# Patient Record
Sex: MALE | Race: WHITE | NOT HISPANIC OR LATINO | Employment: OTHER | URBAN - METROPOLITAN AREA
[De-identification: names, ages, dates, MRNs, and addresses within clinical notes are randomized per-mention and may not be internally consistent; named-entity substitution may affect disease eponyms.]

---

## 2023-02-14 RX ORDER — IBUPROFEN 400 MG/1
200 TABLET ORAL
COMMUNITY
End: 2023-02-16

## 2023-02-16 ENCOUNTER — OFFICE VISIT (OUTPATIENT)
Dept: SURGERY | Facility: CLINIC | Age: 43
End: 2023-02-16
Payer: COMMERCIAL

## 2023-02-16 VITALS
WEIGHT: 210.6 LBS | DIASTOLIC BLOOD PRESSURE: 80 MMHG | HEART RATE: 89 BPM | HEIGHT: 71 IN | TEMPERATURE: 97.2 F | SYSTOLIC BLOOD PRESSURE: 110 MMHG | BODY MASS INDEX: 29.48 KG/M2 | OXYGEN SATURATION: 98 %

## 2023-02-16 DIAGNOSIS — Z83.71 FAMILY HISTORY OF COLONIC POLYPS: ICD-10-CM

## 2023-02-16 DIAGNOSIS — K62.89 HYPERTROPHIED ANAL PAPILLA: Primary | ICD-10-CM

## 2023-02-16 PROCEDURE — 99204 OFFICE O/P NEW MOD 45 MIN: CPT | Performed by: PHYSICIAN ASSISTANT

## 2023-02-16 PROCEDURE — 46600 DIAGNOSTIC ANOSCOPY SPX: CPT | Performed by: PHYSICIAN ASSISTANT

## 2023-02-16 RX ORDER — AZELASTINE HYDROCHLORIDE 137 UG/1
SPRAY, METERED NASAL
COMMUNITY
Start: 2022-11-21

## 2023-02-16 RX ORDER — PREDNISONE 20 MG/1
TABLET ORAL
COMMUNITY
Start: 2022-12-13 | End: 2023-02-16

## 2023-02-16 RX ORDER — SODIUM CHLORIDE, SODIUM LACTATE, POTASSIUM CHLORIDE, CALCIUM CHLORIDE 600; 310; 30; 20 MG/100ML; MG/100ML; MG/100ML; MG/100ML
30 INJECTION, SOLUTION INTRAVENOUS CONTINUOUS
Status: CANCELLED | OUTPATIENT
Start: 2023-04-27

## 2023-02-16 RX ORDER — CLOTRIMAZOLE AND BETAMETHASONE DIPROPIONATE 10; .64 MG/G; MG/G
CREAM TOPICAL
COMMUNITY
Start: 2022-12-13

## 2023-02-16 RX ORDER — TRIAMCINOLONE ACETONIDE 1 MG/G
CREAM TOPICAL
COMMUNITY
Start: 2022-11-20 | End: 2023-02-16

## 2023-02-16 RX ORDER — AZITHROMYCIN 250 MG/1
TABLET, FILM COATED ORAL
COMMUNITY
Start: 2022-11-21 | End: 2023-02-16

## 2023-02-16 NOTE — PROGRESS NOTES
Carlos Alberto Guzman is a 42 y.o. male who is seen as a self-referred consult for hemorrhoid vs. Anal tag.      HPI:  First felt a tag or hemorrhoid/extra tissue about 6 months ago. Not sure if skin tag or hemorrhoid. Never been painful. Occasional bleeding if BMs aren't normal, which is rare. The day he called to schedule the appointment, he had a strange sensation of constantly feeling the need to defecate. A day later that feeling went away. He doesn't recall any potential exacerbating factors other than stress. Denies associated pain or itching. Has BM 1-2 times per day. [place] 3-4 stool consistency. Doesn't strain much with BMs. No creams, ointments, or other topical treatments. Father has history of colon polyp. No family history of colorectal cancer. No prior colonoscopy.     Past Medical History:   Diagnosis Date   • Cornea abrasion, left, initial encounter      History reviewed. No pertinent surgical history.    Social History:   reports that he has never smoked. He has never been exposed to tobacco smoke. He has never used smokeless tobacco. He reports that he does not use drugs.    Marriage status:     Family History   Problem Relation Age of Onset   • Cancer Mother         CARCOMA   • Cancer Maternal Aunt         SKIN CANCER.   • Cancer Maternal Grandmother         SKIN CANCER.   • Cancer Maternal Grandfather         SKIN CANCER.   • Heart disease Paternal Grandfather    • Cancer Paternal Grandfather         SKIN AND BONE CANCER       Current Outpatient Medications:   •  Azelastine HCl 137 MCG/SPRAY solution, , Disp: , Rfl:   •  clotrimazole-betamethasone (LOTRISONE) 1-0.05 % cream, , Disp: , Rfl:   •  fluocinonide (LIDEX) 0.05 % cream, Apply  topically to the appropriate area as directed., Disp: , Rfl:     Allergy  Patient has no known allergies.    Vitals:    02/16/23 1439   BP: 110/80   Pulse: 89   Temp: 97.2 °F (36.2 °C)   SpO2: 98%   -  Body mass index is 29.37 kg/m².    Physical Exam  No acute  distress  Chaperone present  Perianal exam: pedunculated hypertrophied anal papilla in left anterior position. KEYA: good tone, no masses. Anoscopy performed: Grade 0-1 x 3 internal hemorrhoids    Assessment:  1. Hypertrophied anal papilla    2. Family history of colonic polyps        Plan:  • Schedule colonoscopy and excision of anal papilla  • Call with any symptom changes    Time Spent: I spent 53 minutes caring for Carlos Alberto on this date of service. This time includes time spent by me in the following activities: preparing for the visit, performing a medically appropriate examination and/or evaluation, counseling and educating the patient/family/caregiver, ordering medications, tests, or procedures, referring and communicating with other health care professionals, documenting information in the medical record and care coordination.     Clara Mcnally PA-C  Physician Assistant  Colorectal Surgery

## 2023-02-17 ENCOUNTER — PATIENT ROUNDING (BHMG ONLY) (OUTPATIENT)
Dept: SURGERY | Facility: CLINIC | Age: 43
End: 2023-02-17
Payer: COMMERCIAL

## 2023-02-17 NOTE — PROGRESS NOTES
February 17, 2023      I am calling to officially welcome you to our practice and ask about your recent visit. Is this a good time to talk? No. Left voicemail to call back.

## 2023-02-27 ENCOUNTER — TELEPHONE (OUTPATIENT)
Dept: SURGERY | Facility: CLINIC | Age: 43
End: 2023-02-27
Payer: COMMERCIAL

## 2023-02-27 NOTE — TELEPHONE ENCOUNTER
Pt called has questions about upcoming cy and anal papilla excision.Pt would like to speak with you directly. Pt also wanted you to know that he spoke with his father after his appointment and he did not have colon polyps.

## 2023-02-28 NOTE — PROGRESS NOTES
Spoke with patient via telephone.  He had clarification questions regarding anal papilla/polyp/condyloma.  I answered all questions.  We discussed that if he has a negative family history for colon cancer or colon polyps, we could potentially cancel the colonoscopy and have him follow-up with Dr. Zepeda for potential in office excision of anal papilla.  If he does have a positive family history, we could proceed as planned.  He will clarify with family members and call back.

## 2023-04-25 ENCOUNTER — TELEPHONE (OUTPATIENT)
Dept: SURGERY | Facility: CLINIC | Age: 43
End: 2023-04-25
Payer: COMMERCIAL

## 2023-04-25 NOTE — TELEPHONE ENCOUNTER
CALLED TO REMIND OF CY, INFORMED OF ARRIVAL TIME AND LOCATION PT VOICED UNDERSTANDING. ALSO WENT OVER CLEAR LIQUID DIET AS WELL AS TIMES FOR PREP

## 2023-04-27 ENCOUNTER — ANESTHESIA (OUTPATIENT)
Dept: GASTROENTEROLOGY | Facility: HOSPITAL | Age: 43
End: 2023-04-27
Payer: COMMERCIAL

## 2023-04-27 ENCOUNTER — ANESTHESIA EVENT (OUTPATIENT)
Dept: GASTROENTEROLOGY | Facility: HOSPITAL | Age: 43
End: 2023-04-27
Payer: COMMERCIAL

## 2023-04-27 ENCOUNTER — HOSPITAL ENCOUNTER (OUTPATIENT)
Facility: HOSPITAL | Age: 43
Setting detail: HOSPITAL OUTPATIENT SURGERY
Discharge: HOME | End: 2023-04-27
Attending: COLON & RECTAL SURGERY | Admitting: COLON & RECTAL SURGERY
Payer: COMMERCIAL

## 2023-04-27 VITALS
SYSTOLIC BLOOD PRESSURE: 108 MMHG | HEIGHT: 71 IN | DIASTOLIC BLOOD PRESSURE: 66 MMHG | BODY MASS INDEX: 29.26 KG/M2 | OXYGEN SATURATION: 98 % | HEART RATE: 89 BPM | RESPIRATION RATE: 16 BRPM | WEIGHT: 209 LBS

## 2023-04-27 DIAGNOSIS — K64.4 ANAL SKIN TAG: Primary | ICD-10-CM

## 2023-04-27 DIAGNOSIS — Z83.71 FAMILY HISTORY OF COLONIC POLYPS: ICD-10-CM

## 2023-04-27 DIAGNOSIS — K62.89 HYPERTROPHIED ANAL PAPILLA: ICD-10-CM

## 2023-04-27 PROCEDURE — 25010000002 PROPOFOL 10 MG/ML EMULSION: Performed by: ANESTHESIOLOGY

## 2023-04-27 PROCEDURE — 25010000002 KETOROLAC TROMETHAMINE PER 15 MG: Performed by: ANESTHESIOLOGY

## 2023-04-27 PROCEDURE — 88305 TISSUE EXAM BY PATHOLOGIST: CPT | Performed by: COLON & RECTAL SURGERY

## 2023-04-27 RX ORDER — KETOROLAC TROMETHAMINE 30 MG/ML
INJECTION, SOLUTION INTRAMUSCULAR; INTRAVENOUS AS NEEDED
Status: DISCONTINUED | OUTPATIENT
Start: 2023-04-27 | End: 2023-04-27 | Stop reason: SURG

## 2023-04-27 RX ORDER — LIDOCAINE 50 MG/G
1 OINTMENT TOPICAL EVERY 4 HOURS PRN
Qty: 30 G | Refills: 4 | Status: SHIPPED | OUTPATIENT
Start: 2023-04-27 | End: 2023-05-27

## 2023-04-27 RX ORDER — PROPOFOL 10 MG/ML
VIAL (ML) INTRAVENOUS CONTINUOUS PRN
Status: DISCONTINUED | OUTPATIENT
Start: 2023-04-27 | End: 2023-04-27 | Stop reason: SURG

## 2023-04-27 RX ORDER — TRAMADOL HYDROCHLORIDE 50 MG/1
TABLET ORAL
Qty: 16 TABLET | Refills: 0 | Status: SHIPPED | OUTPATIENT
Start: 2023-04-27 | End: 2023-05-01

## 2023-04-27 RX ORDER — BUPIVACAINE HYDROCHLORIDE AND EPINEPHRINE 5; 5 MG/ML; UG/ML
INJECTION, SOLUTION EPIDURAL; INTRACAUDAL; PERINEURAL AS NEEDED
Status: DISCONTINUED | OUTPATIENT
Start: 2023-04-27 | End: 2023-04-27 | Stop reason: HOSPADM

## 2023-04-27 RX ORDER — SODIUM CHLORIDE, SODIUM LACTATE, POTASSIUM CHLORIDE, CALCIUM CHLORIDE 600; 310; 30; 20 MG/100ML; MG/100ML; MG/100ML; MG/100ML
30 INJECTION, SOLUTION INTRAVENOUS CONTINUOUS PRN
Status: DISCONTINUED | OUTPATIENT
Start: 2023-04-27 | End: 2023-04-27 | Stop reason: HOSPADM

## 2023-04-27 RX ORDER — PROPOFOL 10 MG/ML
VIAL (ML) INTRAVENOUS AS NEEDED
Status: DISCONTINUED | OUTPATIENT
Start: 2023-04-27 | End: 2023-04-27 | Stop reason: SURG

## 2023-04-27 RX ORDER — EPHEDRINE SULFATE 50 MG/ML
INJECTION, SOLUTION INTRAVENOUS AS NEEDED
Status: DISCONTINUED | OUTPATIENT
Start: 2023-04-27 | End: 2023-04-27 | Stop reason: SURG

## 2023-04-27 RX ORDER — LIDOCAINE HYDROCHLORIDE 20 MG/ML
INJECTION, SOLUTION INFILTRATION; PERINEURAL AS NEEDED
Status: DISCONTINUED | OUTPATIENT
Start: 2023-04-27 | End: 2023-04-27 | Stop reason: SURG

## 2023-04-27 RX ORDER — SODIUM CHLORIDE, SODIUM LACTATE, POTASSIUM CHLORIDE, CALCIUM CHLORIDE 600; 310; 30; 20 MG/100ML; MG/100ML; MG/100ML; MG/100ML
INJECTION, SOLUTION INTRAVENOUS CONTINUOUS PRN
Status: DISCONTINUED | OUTPATIENT
Start: 2023-04-27 | End: 2023-04-27 | Stop reason: SURG

## 2023-04-27 RX ADMIN — LIDOCAINE HYDROCHLORIDE 60 MG: 20 INJECTION, SOLUTION INFILTRATION; PERINEURAL at 12:38

## 2023-04-27 RX ADMIN — EPHEDRINE SULFATE 10 MG: 50 INJECTION INTRAVENOUS at 13:35

## 2023-04-27 RX ADMIN — SODIUM CHLORIDE, POTASSIUM CHLORIDE, SODIUM LACTATE AND CALCIUM CHLORIDE: 600; 310; 30; 20 INJECTION, SOLUTION INTRAVENOUS at 12:33

## 2023-04-27 RX ADMIN — PROPOFOL 150 MG: 10 INJECTION, EMULSION INTRAVENOUS at 12:41

## 2023-04-27 RX ADMIN — Medication 200 MCG/KG/MIN: at 12:41

## 2023-04-27 RX ADMIN — SODIUM CHLORIDE, POTASSIUM CHLORIDE, SODIUM LACTATE AND CALCIUM CHLORIDE 30 ML/HR: 600; 310; 30; 20 INJECTION, SOLUTION INTRAVENOUS at 12:17

## 2023-04-27 RX ADMIN — KETOROLAC TROMETHAMINE 30 MG: 30 INJECTION, SOLUTION INTRAMUSCULAR; INTRAVENOUS at 12:49

## 2023-04-27 NOTE — ANESTHESIA PREPROCEDURE EVALUATION
Anesthesia Evaluation     Patient summary reviewed and Nursing notes reviewed   no history of anesthetic complications:  NPO Solid Status: > 8 hours  NPO Liquid Status: > 4 hours           Airway   Mallampati: II  Dental      Pulmonary - negative pulmonary ROS and normal exam   Cardiovascular - negative cardio ROS and normal exam        Neuro/Psych- negative ROS  GI/Hepatic/Renal/Endo - negative ROS     Musculoskeletal     Abdominal    Substance History      OB/GYN          Other                        Anesthesia Plan    ASA 1     MAC     intravenous induction     Anesthetic plan, risks, benefits, and alternatives have been provided, discussed and informed consent has been obtained with: patient.        CODE STATUS:

## 2023-04-27 NOTE — ANESTHESIA POSTPROCEDURE EVALUATION
"Patient: Carlos Alberto Guzman    Procedure Summary     Date: 04/27/23 Room / Location:  [facility]    Anesthesia Start: 1233 Anesthesia Stop: 1308    Procedures:       COLONOSCOPY TO CECUM      ANAL TAG EXCISION (Anus) Diagnosis:       Hypertrophied anal papilla      Family history of colonic polyps      (Hypertrophied anal papilla [K62.89])      (Family history of colonic polyps [Z83.71])    Surgeons: Srinivas Zepeda MD Provider: Jennifer Hardin MD    Anesthesia Type: MAC ASA Status: 1          Anesthesia Type: MAC    Vitals  Vitals Value Taken Time   BP 89/42 04/27/23 1316   Temp     Pulse 84 04/27/23 1316   Resp 16 04/27/23 1316   SpO2 94 % 04/27/23 1316           Post Anesthesia Care and Evaluation    Patient location during evaluation: bedside  Patient participation: complete - patient participated  Level of consciousness: awake  Pain management: adequate    Airway patency: patent  Anesthetic complications: No anesthetic complications    Cardiovascular status: acceptable  Respiratory status: acceptable  Hydration status: acceptable    Comments: BP (!) 89/42 (BP Location: Left arm, Patient Position: Lying)   Pulse 84   Resp 16   Ht 180.3 cm (71\")   Wt 94.8 kg (209 lb)   SpO2 94%   BMI 29.15 kg/m²       "

## 2023-04-27 NOTE — H&P
Carlos Alberto Guzman is a 42 y.o. male  who is referred by Srinivas Zepeda MD for a colonoscopy. He   has an indications: family history of colon polyps.     He denies any change in bowel function, melena, or hematochezia.    Past Medical History:   Diagnosis Date   • Cornea abrasion, left, initial encounter        Past Surgical History:   Procedure Laterality Date   • NO PAST SURGERIES         Medications Prior to Admission   Medication Sig Dispense Refill Last Dose   • clotrimazole-betamethasone (LOTRISONE) 1-0.05 % cream    Past Week   • fluocinonide (LIDEX) 0.05 % cream Apply  topically to the appropriate area as directed.   Past Week   • Azelastine HCl 137 MCG/SPRAY solution As Needed.   More than a month       No Known Allergies    Family History   Problem Relation Age of Onset   • Cancer Mother         CARCOMA   • Cancer Maternal Aunt         SKIN CANCER.   • Cancer Maternal Grandmother         SKIN CANCER.   • Cancer Maternal Grandfather         SKIN CANCER.   • Heart disease Paternal Grandfather    • Cancer Paternal Grandfather         SKIN AND BONE CANCER   • Malig Hyperthermia Neg Hx        Social History     Socioeconomic History   • Marital status:    Tobacco Use   • Smoking status: Never     Passive exposure: Never   • Smokeless tobacco: Never   Vaping Use   • Vaping Use: Never used   Substance and Sexual Activity   • Alcohol use: Yes     Comment: OCC   • Drug use: Never   • Sexual activity: Yes     Partners: Female     Comment:  to Patricia Guzman.       Review of Systems   Gastrointestinal: Negative for abdominal pain, nausea and vomiting.   All other systems reviewed and are negative.      Vitals:    04/27/23 1210   BP: 147/92   Pulse: 107   Resp: 20   SpO2: 100%         Physical Exam  Constitutional:       Appearance: He is well-developed.   HENT:      Head: Normocephalic and atraumatic.   Eyes:      Pupils: Pupils are equal, round, and reactive to light.   Cardiovascular:      Rate and Rhythm:  Regular rhythm.   Pulmonary:      Effort: Pulmonary effort is normal.   Abdominal:      General: There is no distension.      Palpations: Abdomen is soft.   Musculoskeletal:         General: Normal range of motion.   Skin:     General: Skin is warm and dry.   Neurological:      Mental Status: He is alert and oriented to person, place, and time.   Psychiatric:         Thought Content: Thought content normal.         Judgment: Judgment normal.           Assessment & Plan      indications: family history of colon polyps and hypertrophied anal papilla        I recommend colonoscopy with removal of hypertrophied anal papilla.  I described risks, benefits of the procedure with the patient including but not limited to bleeding, infection, possibility of perforation and possible polypectomy. All of the patient's questions were answered and they would like to proceed with the above recommendations.

## 2023-04-27 NOTE — DISCHARGE INSTRUCTIONS
For the next 24 hours patient needs to be with a responsible adult.    For 24 hours DO NOT drive, operate machinery, appliances, drink alcohol, make important decisions or sign legal documents.    Start with a light or bland diet if you are feeling sick to your stomach otherwise advance to regular diet as tolerated.    Follow recommendations on procedure report if provided by your doctor.    Call Dr Zepeda for problems [phone]    Problems may include but not limited to: large amounts of bleeding, trouble breathing, repeated vomiting, severe unrelieved pain, fever or chills.

## 2023-04-27 NOTE — OP NOTE
Surgeon: Srinivas Zepeda MD    Surgical        Preoperative diagnosis: Hypertrophied anal papilla [K62.89]  Family history of colonic polyps [Z83.71]    Post-Op Diagnosis Codes:     * Hypertrophied anal papilla [K62.89]     * Family history of colonic polyps [Z83.71]    Procedure:  ANAL TAG EXCISION, * Panel 2 does not exist *    Estimated Blood Loss: {BH ESTIMATED BLOOD LOSS:64475}    Specimens:   Specimens     ID Source Type Tests Collected By Collected At Frozen?    A Anus Tissue · TISSUE PATHOLOGY EXAM   Srinivas Zepeda MD 4/27/23 1304     Description: anal tag    This specimen was not marked as sent.         Order Name Source Comment Collection Info Order Time   TISSUE PATHOLOGY EXAM Anus  Collected By: Srinivas Zepeda MD 4/27/2023  1:05 PM     Release to patient   Routine Release            Indication:  Carlos Alberto Guzman is a 42 y.o. male who comes in with Hypertrophied anal papilla    Family history of colonic polyps  Patient understands risks, benefits,and alternatives wishes to proceed.      Procedure Details:      was responsible for performing the following activities: Retraction, Suction, Irrigation, Suturing, Closing and Placing Dressing and their skilled assistance was necessary for the success of this case

## 2023-04-28 ENCOUNTER — TELEPHONE (OUTPATIENT)
Dept: SURGERY | Facility: CLINIC | Age: 43
End: 2023-04-28
Payer: COMMERCIAL

## 2023-04-28 LAB
LAB AP CASE REPORT: NORMAL
PATH REPORT.FINAL DX SPEC: NORMAL
PATH REPORT.GROSS SPEC: NORMAL

## 2023-04-28 NOTE — TELEPHONE ENCOUNTER
HUB warm transfer called.    Patient states has no pain relief with the lidocaine (has applied already like about 7-8 times) & nor the tramadol (has taken two doses only).    I asked Dr. Zepeda & said to alternate with Ibuprofen.    Relayed message to patient.    Patient voiced understanding.

## 2023-05-01 ENCOUNTER — TELEPHONE (OUTPATIENT)
Dept: SURGERY | Facility: CLINIC | Age: 43
End: 2023-05-01
Payer: COMMERCIAL

## 2023-05-01 DIAGNOSIS — K64.4 ANAL SKIN TAG: ICD-10-CM

## 2023-05-01 DIAGNOSIS — K64.4 ANAL SKIN TAG: Primary | ICD-10-CM

## 2023-05-01 RX ORDER — TRAMADOL HYDROCHLORIDE 50 MG/1
TABLET ORAL
Qty: 20 TABLET | Refills: 0 | Status: SHIPPED | OUTPATIENT
Start: 2023-05-01

## 2023-05-01 NOTE — TELEPHONE ENCOUNTER
Rectal pain level 3-4 with no movement, 5-6 with sitting a #8 with movement and standing.    Denied fever, chills, V/N, abdomina pain, abdominal cramping, constipation.    Pink yellow bleeding & discharge, very little only. I told him signs of healing process.    Area is extremely raw where skin tag was  removed.     Taking 100 mg buprofen x 2 (200 mg) every 6 hours.  Sitz baths only yesterday, did not know to do it frequent. Suggested three times a day & every BM.   Ice packs help some.  Lidocaine using 5 X' s' a day.  Miralax has done one time after surgery. Suggested to start again once daily and when he see after/during BM still hard to increase dosage to two daily every 12 hours.    (Has completed) Tramadol was taken every 6 hours & alternating with the Ibuprofen that Dr. Zepeda had suggested.    Sulma like to know if you can send in a prescribed Ibuprofen (extra strength) for the pain.   And  Another fill for the Tramadol.    [facility] pharmacy.

## 2023-05-01 NOTE — TELEPHONE ENCOUNTER
Phoned patient and is aware of the OTC medications, and script sent to Pharmacy, patient voiced understanding.

## (undated) DEVICE — CANN O2 ETCO2 FITS ALL CONN CO2 SMPL A/ 7IN DISP LF

## (undated) DEVICE — ADAPT CLN BIOGUARD AIR/H2O DISP

## (undated) DEVICE — SNAR POLYP SENSATION STDOVL 27 240 BX40

## (undated) DEVICE — THE SINGLE USE ETRAP – POLYP TRAP IS USED FOR SUCTION RETRIEVAL OF ENDOSCOPICALLY REMOVED POLYPS.: Brand: ETRAP

## (undated) DEVICE — KT ORCA ORCAPOD DISP STRL

## (undated) DEVICE — LN SMPL CO2 SHTRM SD STREAM W/M LUER

## (undated) DEVICE — TUBING, SUCTION, 1/4" X 10', STRAIGHT: Brand: MEDLINE

## (undated) DEVICE — SENSR O2 OXIMAX FNGR A/ 18IN NONSTR